# Patient Record
Sex: FEMALE | Race: WHITE
[De-identification: names, ages, dates, MRNs, and addresses within clinical notes are randomized per-mention and may not be internally consistent; named-entity substitution may affect disease eponyms.]

---

## 2017-01-10 NOTE — XMS REPORT
Continuity of Care Document

 Created on: 2014



MILLA OBWMAN

External Reference #: [id number]

: 1950

Sex: Female



Demographics







 Address  [address] [place] [address]  [address]

 

 Home Phone  [phone]

 

 Preferred Language  English

 

 Marital Status  Unknown

 

 [employer] Affiliation  Unknown

 

 Race  Unknown

 

 Ethnic Group  Unknown





Author







 Author  [facility]

 

 Organization  [facility]

 

 Address  Unknown

 

 Phone  Unavailable







Support







 Name  Relationship  Address  Phone

 

 JULITO PAGAN DIRK DO  Caregiver  [address] [place]  [address] [phone]

 

 MAIRA BOWMAN  Next Of Kin  [address] [place] [address]  [address] [phone]







Insurance Providers







 Payer Name  Policy Number  Subscriber Name  Relationship

 

 [employer]  [id number]  Milla Bowman  19 Self / Same As Patient

 

 Comm [employer]sover Enter Ins Name  [id number]  Milla Bowman  18 Self / Same As 
Patient







Advance Directives







 Directive  Response  Recorded Date/Time

 

 Advance Directives  No  13 7:56am

 

 Health Care Power of   No  10/15/08 6:34am

 

 Organ Donor  No  13 7:56am







Problems

No known problems or medical conditions.



Medications







 Medication  Dose  Route  Sig  Days/Qty  Instructions  Order Date  Discontinued 
Date  Status

 

 [Glucosaminecondroiti]                 10/10/07     Active

 

 [Femhrt]                 10/10/07  03/01/13  Discontinued

 

 Escitalopram Oxalate                 10/10/07     Active

 

 Ascorbic Acid                 10/09/08     Active

 

 Vitamin B Complex/Folic Acid                 10/09/08     Active

 

 Calcium                 10/09/08     Active

 

 Glucosamine/Chondroitin                 10/09/08  03/01/13  Discontinued

 

 Oxybutynin Chloride                 10/09/08  03/01/13  Discontinued

 

 Celecoxib                 10/09/08     Active

 

 Atenolol                 10/09/08     Active

 

 Multivitamins                 10/09/08     Active

 

 Aspirin                 10/09/08     Active

 

 Fish Oil                 10/09/08     Active

 

 Potassium Chloride                 10/09/08     Active

 

 Torsemide                 10/09/08     Active

 

 Escitalopram Oxalate                 10/09/08  03/01/13  Discontinued

 

 Hyoscyamine Sulfate                 10/09/08  03/01/13  Discontinued

 

 Dorzolamide/Timolol                 10/09/08     Active

 

 Brimonidine Tartrate                 10/09/08     Active

 

 Bimatoprost                 10/09/08     Active

 

 [hyosoyajine]        BID PRN        13     Active

 

 [colcrys ]        BEDTIME        13     Active

 

 Amitriptyline Hcl  15,010 Mg  PO  BEDTIME        13  
Discontinued

 

 Lisinopril  20 Mg  PO  DAILY        13     Active

 

 Ginkgo Biloba Leaf Extract  30 Mg  PO  TWICE A DAY        13     Active

 

 Febuxostat  80 Mg  PO  BEDTIME        13     Active

 

 Amitriptyline HCl (Elavil)  1 Each  PO  BEDTIME        13     Active







Social History







 Social History Problem  Response  Recorded Date/Time

 

 Recent Foreign Travel  No  2014 10:10am







Hospital Discharge Instructions

No hospital discharge instructions.



Plan of Care

No plan of care.



Functional Status

No functional status results.



Allergies, Adverse Reactions, Alerts







 Allergen  Type  Severity  Reaction  Status  Last Updated

 

 Morphine  Allergy  Mild  ITCHING  Active  10/10/07

 

 No Known Allergies  Allergy  Unknown     Active  06

 

 NUTS  Allergy  Unknown  .  Active  06







Immunizations

No immunization records.



Vital Signs

No known vital signs results.



Results







 Test  Source  Date  Result  Interp.  Ref. Range  Comments

 

 Activated Partial Thromboplast Time     2007 10:45am  25 SEC  N  24
-35  Has specimen been collected/obtained? Y

 

 Alanine Aminotransferase (ALT/SGPT)     2007 10:45am  57 U/L  N  30
-65  Has specimen been collected/obtained? Y

 

 Albumin     2007 10:45am  3.9 G/DL  N  3.4-5.0  Has specimen been 
collected/obtained? Y

 

 Alkaline Phosphatase     2007 10:45am  183 U/L  H    Has 
specimen been collected/obtained? Y

 

 Anti-Nuclear Antibody Screen     2006 3:17pm  See report     -   

 

 Aspartate Amino Transf (AST/SGOT)     2007 10:45am  30 U/L  N  15-
37  Has specimen been collected/obtained? Y

 

 BUN/Creatinine Ratio     2011 8:07am  32     -   

 

 Basophils # (Auto)     2007 5:50am  0.0 10^3/uL  N  0.0-0.1   

 

 Basophils (%) (Auto)     2007 5:50am  0 %  N  0-10   

 

 Blood Urea Nitrogen     2011 8:07am  48 MG/DL  H  7-18   

 

 C-Reactive Protein, Quantitative     2006 3:17pm  1.7     -  
CRPNORMAL RANGES

MALE   0.0 - 0.8 MG/DL

FEMALE 0.0 - 0.8 MG/DL

 

 Calcium Level     2007 5:50am  8.0 MG/DL  L  8.5-10.1   

 

 Carbon Dioxide Level     2011 8:07am  24 MMOL/L  N  21-32   

 

 Chloride Level     2011 8:07am  101 MMOL/L  N  101-110   

 

 Creatinine     2011 8:07am  1.5 MG/DL  H  0.6-1.3   

 

 Eosinophils # (Auto)     2007 5:50am  0.2 10^3/uL  N  0.0-0.3   

 

 Eosinophils (%) (Auto)     2007 5:50am  2 %  N  0-10   

 

 Erythrocyte Sedimentation Rate     2007 10:45am  18 MM/HR  N  0-30
  Has specimen been collected/obtained? Y

 

 Glucose Level     2011 8:07am  167 MG/DL  H     

 

 HLA-B27     2006 6:11pm  Neg     -   

 

 Hematocrit     2011 8:07am  33 %  L  35-52   

 

 Hemoglobin     2011 8:07am  11.3 G/DL  L  11.5-16.0   

 

 Lymphocytes # (Auto)     2007 5:50am  1.3 X 10^3  N  1.0-4.0   

 

 Lymphocytes (%) (Auto)     2007 5:50am  12 %  N  12-44   

 

 Magnesium Level     2007 5:50am  1.9 MG/DL  N  1.8-2.4   

 

 Mean Corpuscular Hemoglobin     2011 8:07am  29 PG  N  25-34   

 

 Mean Corpuscular Hemoglobin Concent     2011 8:07am  34 G/DL  N  32
-36   

 

 Mean Corpuscular Volume     2011 8:07am  83 FL  N  80-99   

 

 Mean Platelet Volume     2011 8:07am  9.6 FL  N  7.4-10.4   

 

 Miscellaneous Test     2006 4:25pm  See report     -  ANTI CCP 
ANTIBODY.   RED TOP RFS

 

 Miscellaneous Test Result     2006 4:25pm  See report     -  ANTI 
CCP ANTIBODY.   RED TOP RFS

 

 Monocytes # (Auto)     2007 5:50am  1.2 X 10^3  H  0.0-1.0   

 

 Monocytes (%) (Auto)     2007 5:50am  11 %  N  0-12   

 

 Neutrophils # (Auto)     2007 5:50am  8.5 X 10^3  H  1.8-7.8   

 

 Neutrophils (%) (Auto)     2007 5:50am  76 %  H  42-75   

 

 Platelet Count     2011 8:07am  323 10^3/uL  N  130-400   

 

 Potassium Level     2011 8:07am  4.0 MMOL/L  N  3.6-5.0   

 

 Prothromb Time International Ratio     2007 10:45am  1.0  N  0.8-
1.4                       INTERPRETIVE DATASUGGESTED THERAPEUTIC RANGE FOR INR'S
:

   VENOUS THROMBOSIS, PULMONARY EMBOLISM, OR PREVENTION OF

   SYSTEMIC EMBOLISM (EG. IN ATRIAL FIBRILLATION):

                     2.0  -  3.0

   MECHANICAL PROSTHETIC HEART VALVES:

                     2.5  -  3.5*

*NOTE:  INR'S UP TO 4.5 MAY BE NECESSARY IN SELECTED GROUPS

        OF HIGH RISK PATIENTS.

[employer] CONSENSUS

CONFERENCE ON ANTITHROMBOTIC THERAPY (2000).

 

 Prothrombin Time     2007 10:45am  13.7 SEC  N  12.2-14.7  Has 
specimen been collected/obtained? Y

 

 Red Blood Count     2011 8:07am  3.97 10^6/uL  L  4.35-5.85   

 

 Red Cell Distribution Width     2011 8:07am  13.3 %  N  10.0-14.5 
  

 

 Rheumatoid Factor     2006 3:17pm  1:2     -   

 

 Sodium Level     2011 8:07am  133 MMOL/L  L  135-145   

 

 Total Bilirubin     2007 10:45am  0.5 MG/DL  N  0.0-1.0  Has 
specimen been collected/obtained? Y

 

 Total Protein     2007 10:45am  8.0 G/DL  N  6.4-8.2  Has specimen 
been collected/obtained? Y

 

 Uric Acid     2006 3:17pm  7.1 MG/DL  N  2.6-7.2   

 

 Urine Amorphous Sediment     2006 1:55pm  Mod bree phosphate  H  -  
Has specimen been collected/obtained? YSpecimen Description CLEAN CATCH

 

 Urine Bacteria     October 10, 2007 6:42am  Large  H  -  Has specimen been 
collected/obtained? YSpecimen Description FIRST MORNING VOIDED

 

 Urine Bilirubin     October 10, 2007 6:42am  Negative     -  Has specimen been 
collected/obtained? YSpecimen Description FIRST MORNING VOIDED

 

 Urine Casts     October 10, 2007 6:42am  None     -  Has specimen been 
collected/obtained? YSpecimen Description FIRST MORNING VOIDED

 

 Urine Clarity     October 10, 2007 6:42am  Cloudy  H  -  Has specimen been 
collected/obtained? YSpecimen Description FIRST MORNING VOIDED

 

 Urine Color     October 10, 2007 6:42am  Yellow     -  Has specimen been 
collected/obtained? YSpecimen Description FIRST MORNING VOIDED

 

 Urine Crystals     October 10, 2007 6:42am  None     -  Has specimen been 
collected/obtained? YSpecimen Description FIRST MORNING VOIDED

 

 Urine Culture Indicated     October 10, 2007 6:42am  Yes     -  Has specimen 
been collected/obtained? YSpecimen Description FIRST MORNING VOIDED

 

 Urine Glucose (UA)     October 10, 2007 6:42am  Negative     -  Has specimen 
been collected/obtained? YSpecimen Description FIRST MORNING VOIDED

 

 Urine Ketones     October 10, 2007 6:42am  Negative     -  Has specimen been 
collected/obtained? YSpecimen Description FIRST MORNING VOIDED

 

 Urine Leukocyte Esterase     October 10, 2007 6:42am  2+  H  -  Has specimen 
been collected/obtained? YSpecimen Description FIRST MORNING VOIDED

 

 Urine Mucus     October 10, 2007 6:42am  Negative     -  Has specimen been 
collected/obtained? YSpecimen Description FIRST MORNING VOIDED

 

 Urine Nitrate     October 10, 2007 6:42am  Negative     -  Has specimen been 
collected/obtained? YSpecimen Description FIRST MORNING VOIDED

 

 Urine Protein     October 10, 2007 6:42am  Trace     -  Has specimen been 
collected/obtained? YSpecimen Description FIRST MORNING VOIDED

 

 Urine RBC     October 10, 2007 6:42am  None /HPF     -  Has specimen been 
collected/obtained? YSpecimen Description FIRST MORNING VOIDED

 

 Urine Specific Gravity     October 10, 2007 6:42am  1.025  H  -  Has specimen 
been collected/obtained? YSpecimen Description FIRST MORNING VOIDED

 

 Urine Squamous Epithelial Cells     October 10, 2007 6:42am  10-25  H  -  Has 
specimen been collected/obtained? YSpecimen Description FIRST MORNING VOIDED

 

 Urine Urobilinogen     October 10, 2007 6:42am  Normal MG/DL     -  Has 
specimen been collected/obtained? YSpecimen Description FIRST MORNING VOIDED

 

 Urine WBC     October 10, 2007 6:42am  25-50 /HPF  H  -  Has specimen been 
collected/obtained? YSpecimen Description FIRST MORNING VOIDED

 

 Urine pH     October 10, 2007 6:42am  6.0     -  Has specimen been collected/
obtained? YSpecimen Description FIRST MORNING VOIDED

 

 White Blood Count     2011 8:07am  8.0 10^3/uL  N  4.3-11.0   

 

 Glucometer     October 15, 2008 9:40am  126 MG/DL  H    Comments to 
Phlebotomist: IN PAR 3

 

 Estimat Glomerular Filtration Rate     2011 8:07am  35     -      
              GFR INTERPRETIVE DATA

         UNITS FOR ESTIMATED GFR (eGFR): mL/min/1.73 M2

         REFERENCE RANGE FOR ESTIMATED GFR (eGFR)

                                          eGFR

         NORMAL eGFR                       >60

         MODERATELY DECREASED eGFR          30-59

         SEVERLY DECREASED eGFR             15-29

         KIDNEY FAILURE                    <15 (OR DIALYSIS)

 

 MRSA Screen  Other-Nasal  2007 10:40am            

 

 MRSA Screen  Nasal  2008 4:45pm  MRSA not isolated         

 

 Urine Culture  Urine-Clean Catch  October 10, 2007 6:42am  Candida Glabrata   
      







Procedures

No known history of procedures.



Encounters







 Encounter  Location  Date/Time

 

 Registered Clinic  [facility]  14 7:53pm

## 2017-03-09 ENCOUNTER — HOSPITAL ENCOUNTER (OUTPATIENT)
Dept: HOSPITAL 75 - REHAB | Age: 67
LOS: 1 days | Discharge: HOME | End: 2017-03-10
Attending: INTERNAL MEDICINE
Payer: MEDICARE

## 2017-03-09 DIAGNOSIS — M25.551: Primary | ICD-10-CM

## 2017-03-09 DIAGNOSIS — M54.6: ICD-10-CM

## 2017-04-10 ENCOUNTER — HOSPITAL ENCOUNTER (OUTPATIENT)
Dept: HOSPITAL 75 - RAD | Age: 67
End: 2017-04-10
Attending: INTERNAL MEDICINE
Payer: MEDICARE

## 2017-04-10 DIAGNOSIS — Z12.31: Primary | ICD-10-CM

## 2017-04-10 PROCEDURE — 77067 SCR MAMMO BI INCL CAD: CPT

## 2017-04-11 NOTE — DIAGNOSTIC IMAGING REPORT
Bilateral screening mammogram



The current study was also evaluated with a Computer Aided

Detection (CAD) system.



INDICATION: Screening. No current complaints stated on the

questionnaire.



COMPARISON: 12/2/15.



FINDINGS:

The breasts are composed of scattered fibroglandular densities.

There are occasional punctate calcifications. Allowing for

technique and positional differences, no suspicious change is

seen.



IMPRESSION: No significant change.



ACR BI-RADS Category 2: Benign findings.

Result letter will be mailed to the patient.

Note: At least 10% of breast cancer is not imaged by mammography.



Dictated by:



Dictated on workstation # [id number]

## 2017-04-24 ENCOUNTER — HOSPITAL ENCOUNTER (OUTPATIENT)
Dept: HOSPITAL 75 - RAD | Age: 67
End: 2017-04-24
Attending: INTERNAL MEDICINE
Payer: MEDICARE

## 2017-04-24 DIAGNOSIS — R16.0: ICD-10-CM

## 2017-04-24 DIAGNOSIS — R74.0: Primary | ICD-10-CM

## 2017-04-24 PROCEDURE — 76705 ECHO EXAM OF ABDOMEN: CPT

## 2017-04-24 NOTE — DIAGNOSTIC IMAGING REPORT
EXAMINATION:

Liver ultrasound.



INDICATION:

Increased transaminases.



FINDINGS:

The pancreas is obscured by bowel gas. The liver is enlarged

measuring 21 cm in craniocaudal diagonal dimension and is

hyperechoic and dense which may relate to fatty infiltration or

hepatitis. There is hepatopetal flow in the portal vein. The CBD

is obscured by bowel gas. The gallbladder has been removed. No

intrahepatic biliary dilatation is demonstrated.



The right kidney is 14 cm in length with no hydronephrosis.

There is a simple appearing cyst measuring 4.8 x 2.8 x 4.1 cm

along the mid lateral aspect of the right kidney and another

simple cyst measuring 2.9 cm inferiorly. No solid mass. No fluid

collection in the upper right abdomen.



IMPRESSION:

Hepatomegaly. Parenchymal increased echogenicity may relate to

fatty infiltration or hepatitis.



Dictated by:



Dictated on workstation # [id number]

## 2017-06-29 ENCOUNTER — HOSPITAL ENCOUNTER (OUTPATIENT)
Dept: HOSPITAL 75 - RAD | Age: 67
End: 2017-06-29
Attending: INTERNAL MEDICINE
Payer: MEDICARE

## 2017-06-29 DIAGNOSIS — Z96.652: ICD-10-CM
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
## 2017-06-29 DIAGNOSIS — S89.92XA: Primary | ICD-10-CM

## 2017-06-29 PROCEDURE — 73562 X-RAY EXAM OF KNEE 3: CPT

## 2017-06-29 NOTE — DIAGNOSTIC IMAGING REPORT
EXAMINATION: Two views of the left knee.



INDICATION: Fall.



FINDINGS: There is no fracture or dislocation. There is a total

knee replacement prosthesis in good alignment. No evidence of

loosening or hardware displacement. No suprapatellar effusion is

seen.



IMPRESSION: Total left knee arthroplasty in good position. 



Dictated by: 



  Dictated on workstation # [id number]

## 2019-10-25 ENCOUNTER — HOSPITAL ENCOUNTER (OUTPATIENT)
Dept: HOSPITAL 75 - RAD | Age: 69
End: 2019-10-25
Attending: INTERNAL MEDICINE
Payer: MEDICARE

## 2019-10-25 DIAGNOSIS — I11.9: ICD-10-CM

## 2019-10-25 DIAGNOSIS — R09.89: ICD-10-CM

## 2019-10-25 DIAGNOSIS — M1A.00X0: ICD-10-CM

## 2019-10-25 DIAGNOSIS — E66.01: ICD-10-CM

## 2019-10-25 DIAGNOSIS — E11.9: Primary | ICD-10-CM

## 2019-10-25 DIAGNOSIS — G47.33: ICD-10-CM

## 2019-10-25 PROCEDURE — 71046 X-RAY EXAM CHEST 2 VIEWS: CPT

## 2019-10-25 NOTE — DIAGNOSTIC IMAGING REPORT
Patient History:  DYSPNEA. 



Technique: Two views of the chest



Comparison:  03/01/2013



FINDINGS:



The lung volumes are normal. No focal consolidation is seen. No

large pleural effusion or pneumothorax is seen. There is

cardiomegaly with central pulmonary vascular congestion. No overt

pulmonary edema. No acute osseous abnormality is seen.



IMPRESSION:

1. Cardiomegaly with central pulmonary vascular congestion. No

overt pulmonary edema.



Dictated by: 



  Dictated on workstation # [id number]

## 2019-10-28 ENCOUNTER — HOSPITAL ENCOUNTER (OUTPATIENT)
Dept: HOSPITAL 75 - CARD | Age: 69
End: 2019-10-28
Attending: INTERNAL MEDICINE
Payer: MEDICARE

## 2019-10-28 VITALS — HEIGHT: 63.78 IN | BODY MASS INDEX: 45.72 KG/M2 | WEIGHT: 264.55 LBS

## 2019-10-28 VITALS — DIASTOLIC BLOOD PRESSURE: 74 MMHG | SYSTOLIC BLOOD PRESSURE: 160 MMHG

## 2019-10-28 DIAGNOSIS — R06.00: ICD-10-CM

## 2019-10-28 DIAGNOSIS — N64.89: Primary | ICD-10-CM

## 2019-10-28 PROCEDURE — 93017 CV STRESS TEST TRACING ONLY: CPT

## 2019-10-28 PROCEDURE — 78452 HT MUSCLE IMAGE SPECT MULT: CPT

## 2019-10-28 RX ADMIN — Medication PRN ML: at 07:23

## 2019-10-28 RX ADMIN — Medication PRN ML: at 08:08

## 2019-10-28 NOTE — STRESS TEST
DATE OF SERVICE:  



NUCLEAR MYOVIEW REPORT



REFERRING PHYSICIAN:

Dr. Bruno.



In summary, the patient received 10.31 mCi of technetium-99 Myoview and the

resting images were acquired.  Then, with peak stress level at 31.9 mCi of

technetium-99 Myoview were injected and the stress images were acquired.



The resting and stress images were reviewed and compared in the short axis,

horizontal long axis, and vertical long axis views.  Review of the images showed

breast attenuation affecting the quality of the images.  There is decreased

uptake involving the mid to apical anterior wall and anterolateral wall with

mild reversibility.  SSS is 7, SDS 2, TID value 0.98.  On the gated images, the

left ventricle appeared to be normal size with normal contractility.  Calculated

ejection fraction 74%.



CONCLUSION:

1.  Breast attenuation affecting the quality of the images with questionable

ischemia involving the mid to apical anterior wall and anterolateral wall.

2.  Normal left ventricular size with normal contractility.  Calculated ejection

fraction 64%.





Job ID: [id number]

DocumentID: [id number]

Dictated Date:  10/28/2019 11:22:30

Transcription Date: 10/28/2019 11:45:02

Dictated By: BENITA CHAUDHRY MD

## 2020-02-20 ENCOUNTER — HOSPITAL ENCOUNTER (OUTPATIENT)
Dept: HOSPITAL 75 - RAD | Age: 70
End: 2020-02-20
Attending: INTERNAL MEDICINE
Payer: MEDICARE

## 2020-02-20 DIAGNOSIS — N28.1: Primary | ICD-10-CM

## 2020-02-20 DIAGNOSIS — N18.4: ICD-10-CM

## 2020-02-20 PROCEDURE — 76770 US EXAM ABDO BACK WALL COMP: CPT

## 2020-02-21 NOTE — DIAGNOSTIC IMAGING REPORT
PROCEDURE: US Renal Bilateral.



TECHNIQUE: Multiple real-time grayscale images were obtained over

the kidneys in various projections bilaterally.



INDICATION: Chronic kidney disease, renal cysts.



FINDINGS: There are no previous renal ultrasound examinations

available for comparison. However, the hepatic ultrasound exam of

04/24/2017 did note a 4.8 x 2.8 x 4.3 cm cyst arising from the

midportion of the right kidney. There is another roughly 3 cm

cyst along the inferior pole of the right kidney.



This exam was technically difficult due to the patient's body

habitus. Both kidneys were identified. The right kidney measures

9.9 x 5.1 x 5.9 cm while the left kidney is estimated to be 10.0

x 6.9 x 5.1 cm. The cysts associated with the right kidney seen

previously are again evident and do not seem to have changed

significantly. There is still no evidence for a solid mass

arising from either kidney. There is no sign of hydronephrosis.



The renal cortices are thinned and more echogenic than usually

seen. This appearance does suggest chronic medical renal disease.



The bladder is only partially filled and consequently not well

evaluated. There is no obvious bladder abnormality noted.

However, neither ureteral jet could be visualized.



IMPRESSION:

1. There is no evidence for a solid renal mass or for an acute

abnormality of either kidney.

2. The cysts associated with the right kidney seen previously are

again evident and no different.

3. The appearance of the cortices of both kidneys does suggest

chronic medical renal disease.

4. The bladder is grossly unremarkable but neither ureteral jet

was visualized.



Dictated by: 



  Dictated on workstation # [id number]